# Patient Record
Sex: MALE | Race: WHITE | NOT HISPANIC OR LATINO | Employment: FULL TIME | URBAN - METROPOLITAN AREA
[De-identification: names, ages, dates, MRNs, and addresses within clinical notes are randomized per-mention and may not be internally consistent; named-entity substitution may affect disease eponyms.]

---

## 2018-10-23 ENCOUNTER — OFFICE VISIT (OUTPATIENT)
Dept: OBGYN CLINIC | Facility: MEDICAL CENTER | Age: 53
End: 2018-10-23
Payer: COMMERCIAL

## 2018-10-23 ENCOUNTER — APPOINTMENT (OUTPATIENT)
Dept: RADIOLOGY | Facility: CLINIC | Age: 53
End: 2018-10-23
Payer: COMMERCIAL

## 2018-10-23 VITALS
BODY MASS INDEX: 31.15 KG/M2 | WEIGHT: 230 LBS | SYSTOLIC BLOOD PRESSURE: 121 MMHG | HEART RATE: 44 BPM | DIASTOLIC BLOOD PRESSURE: 74 MMHG | HEIGHT: 72 IN

## 2018-10-23 DIAGNOSIS — R20.2 NUMBNESS AND TINGLING: ICD-10-CM

## 2018-10-23 DIAGNOSIS — R20.0 NUMBNESS AND TINGLING: ICD-10-CM

## 2018-10-23 DIAGNOSIS — M25.512 ACUTE PAIN OF LEFT SHOULDER: Primary | ICD-10-CM

## 2018-10-23 DIAGNOSIS — M25.512 ACUTE PAIN OF LEFT SHOULDER: ICD-10-CM

## 2018-10-23 PROCEDURE — 73030 X-RAY EXAM OF SHOULDER: CPT

## 2018-10-23 PROCEDURE — 72040 X-RAY EXAM NECK SPINE 2-3 VW: CPT

## 2018-10-23 PROCEDURE — 99203 OFFICE O/P NEW LOW 30 MIN: CPT | Performed by: ORTHOPAEDIC SURGERY

## 2018-10-23 RX ORDER — METOPROLOL TARTRATE 100 MG/1
TABLET ORAL
COMMUNITY
Start: 2018-10-02

## 2018-10-23 RX ORDER — ATORVASTATIN CALCIUM 20 MG/1
TABLET, FILM COATED ORAL
COMMUNITY
Start: 2018-10-02

## 2018-10-23 NOTE — PROGRESS NOTES
Orthopaedic Surgery - Office Note  Maribel Villeda (83 y o  male)   : 1965   MRN: [id number]  Encounter Date: 10/23/2018    Chief Complaint   Patient presents with    Left Shoulder - Pain, Numbness       Assessment / Plan  cervical left C6 radiculopathy    · Activity as tolerated  · Begin outpatient PT for left C6 radiculopathy  Return in about 6 weeks (around 2018) for Recheck  History of Present Illness  Maribel Villeda is a 47 y o  male who presents to the office with left shoulder pain  He describes a possible PHIL as an incident about  3 weeks ago where he was walking his dog when another dog approached causing his to pull on the leash, hyperextending his arm  He describes numbness into his forearm thumb and index finger  Review of Systems  Pertinent items are noted in HPI  All other systems were reviewed and are negative  A comprehensive review of systems was negative  Physical Exam  /74   Pulse (!) 44   Ht 6' (1 829 m)   Wt 104 kg (230 lb)   BMI 31 19 kg/m²   Cons: Appears well  No apparent distress  Psych: Alert  Oriented x3  Mood and affect normal   Eyes: PERRLA, EOMI  Resp: Normal effort  No audible wheezing or stridor  CV: Palpable pulse  No discernable arrhythmia  No LE edema  Lymph:  No palpable cervical, axillary, or inguinal lymphadenopathy  Skin: Warm  No palpable masses  No visible lesions  Neuro: Normal muscle tone  Normal and symmetric DTR's  Left Shoulder Exam  Alignment / Posture:  Normal shoulder posture  Inspection:  No swelling  No deformity  Palpation:  mild tenderness at trapezius  ROM:  Shoulder   Shoulder ER 70  Shoulder IR T6  Shoulder AER 80  Shoulder AIR 80  Strength:  5/5 supraspinatus, infraspinatus, and subscapularis  Stability:  No objective shoulder instability  Tests: (+) Spurling  Neurovascular:  dimished sensation to Ax/R/M/U nerve distributions 2+ radial pulse      Studies Reviewed  XR of left shoulder and cervical spine - 10/23/18 shows no fractures, degenerative changes to C5/6    Procedures  No procedures today  Medical, Surgical, Family, and Social History  The patient's medical history, family history, and social history, were reviewed and updated as appropriate  Past Medical History:   Diagnosis Date    Diabetes insipidus ([address] )     Hyperlipemia     Hypertension        Past Surgical History:   Procedure Laterality Date    ANKLE SURGERY Left     bone spur    NEPHRECTOMY Right 2005       Family History   Problem Relation Age of Onset    Heart disease Father        Social History     Occupational History    Not on file       Social History Main Topics    Smoking status: Never Smoker    Smokeless tobacco: Never Used    Alcohol use Yes    Drug use: Unknown    Sexual activity: Not on file       No Known Allergies      Current Outpatient Prescriptions:     atorvastatin (LIPITOR) 20 mg tablet, TAKE 1 TABLET BY MOUTH  DAILY, Disp: , Rfl:     metFORMIN (GLUCOPHAGE) 500 mg tablet, TAKE 1 TABLET BY MOUTH TWO  TIMES DAILY WITH MEALS, Disp: , Rfl:     metoprolol tartrate (LOPRESSOR) 100 mg tablet, TAKE 1 TABLET BY MOUTH TWO  TIMES DAILY, Disp: , Rfl:       Antonietta Chavira MA    Scribe Attestation    I,:   Antonietta Chavira MA am acting as a scribe while in the presence of the attending physician :        I,:   Lisset Todd MD personally performed the services described in this documentation    as scribed in my presence :

## 2018-10-30 ENCOUNTER — EVALUATION (OUTPATIENT)
Dept: PHYSICAL THERAPY | Facility: REHABILITATION | Age: 53
End: 2018-10-30
Payer: COMMERCIAL

## 2018-10-30 DIAGNOSIS — M54.12 CERVICAL RADICULOPATHY: Primary | ICD-10-CM

## 2018-10-30 PROCEDURE — G8984 CARRY CURRENT STATUS: HCPCS | Performed by: PHYSICAL THERAPIST

## 2018-10-30 PROCEDURE — 97140 MANUAL THERAPY 1/> REGIONS: CPT | Performed by: PHYSICAL THERAPIST

## 2018-10-30 PROCEDURE — 97162 PT EVAL MOD COMPLEX 30 MIN: CPT | Performed by: PHYSICAL THERAPIST

## 2018-10-30 PROCEDURE — G8985 CARRY GOAL STATUS: HCPCS | Performed by: PHYSICAL THERAPIST

## 2018-10-30 NOTE — PROGRESS NOTES
PT Evaluation     Today's date: 10/30/2018  Patient name: Bill Scott  : 1965  MRN: [id number]  Referring provider: Austin Wooten MD  Dx:   Encounter Diagnosis     ICD-10-CM    1  Cervical radiculopathy M54 12                   Assessment  Impairments: abnormal or restricted ROM, impaired physical strength, lacks appropriate home exercise program and pain with function    Symptom irritability: high  Assessment details: Pt is a 46 yo male with left cervical radiculopathy whose symptoms consist of left periscapular pain and paresthesias and decreased sensation into the thumb and index finger  Pain has been present for one month and is quite severe at time limiting his ability to drive and sleep  He is currently in forward head posture  Correction of posture results in peripheralization of symptoms  We were able to reduce symptoms with both left side bending and rotation but we were unable to regain cervical retraction or extension or abolish peripheral symptoms  Pt would benefit from manual therapy and repeated motion exercises to reduce derangement and restore functional mobility  He will also benefit from [address] and ES to decrease pain and spasm  Understanding of Dx/Px/POC: excellent   Prognosis: good  Prognosis details: Pt has both weakness and sensation changes and I was unable to centralize symptoms today  Plan  Patient would benefit from: skilled physical therapy  Referral necessary: No  Planned modality interventions: TENS  Planned therapy interventions: manual therapy, joint mobilization, patient education, therapeutic exercise and home exercise program  Frequency: 2x week  Duration in weeks: 12        Subjective Evaluation    History of Present Illness  Mechanism of injury: One month ago pain began in the scapular region  Developed tingling and numbness in the thumb and fore finger  Numbness increases when upright  Drives up to 6 hours per day for work     Pain  Current pain rating: 7  At best pain rating: 3  At worst pain ratin  Location: right scapular region  Quality: dull ache  Aggravating factors: sitting      Diagnostic Tests  Abnormal x-ray: C6 issue  Patient Goals  Patient goals for therapy: decreased pain  Patient goal: resolve numbness, drive, use computer        Objective     Special Questions  Positive for disturbed sleep  Negative for night pain, dizziness, faints, headaches, nausea/motion sickness, tinnitus, trouble swallowing, difficulty breathing, shortness of breath, respiratory pain and visual change    Postural Observations  Seated posture: poor  Correction of posture: makes symptoms worse        Palpation   Left   Hypertonic in the cervical paraspinals, levator scapulae, suboccipitals and upper trapezius  Tenderness of the levator scapulae and upper trapezius  Right   Hypertonic in the suboccipitals and upper trapezius  Tenderness     Additional Tenderness Details  C5, C6 tender   Hypomobility throughout the cervical and upper thoracic spine  Neurological Testing     Sensation   Cervical/Thoracic   Left   Intact: light touch    Right   Intact: light touch    Reflexes   Left   Biceps (C5/C6): normal (2+)    Right   Biceps (C5/C6): normal (2+)    Active Range of Motion     Additional Active Range of Motion Details  ROM restriction:  Flexion: WNL  Extension: mod limit  Side bending:  WNL  Rotation: R WNL  L mod limit    Passive Range of Motion     Additional Passive Range of Motion Details  PROM WNL except retraction and extension, mod limit by pain    Joint Play Joints within functional limits: C1, C2, C3 and C4 Hypomobile: C5, C6, C7, T1 and T2 Pain: C5, C6 and C7     Strength/Myotome Testing   Cervical Spine     Left   Interossei strength (t1): 5  Neck lateral flexion (C3): 5    Right   Interossei strength (t1): 5etr  Neck lateral flexion (C3): 5    Left Shoulder     Planes of Motion   Flexion: 4+   Abduction: 5     Right Shoulder     Planes of Motion Flexion: 5   Abduction: 5     Left Elbow   Flexion: 4  Extension: 5    Right Elbow   Flexion: 5  Extension: 5    Left Wrist/Hand   Wrist flexion: 5    Right Wrist/Hand   Wrist flexion: 5    Tests   Cervical     Left   Negative cervical distraction, Spurling's sign and cervical compression test       Right   Positive Spurling's sign  Negative cervical distraction and cervical compression test       Left Shoulder   Positive ULTT1 and ULTT3  Negative ULTT4       Additional Tests Details  Cervical Mechanical Assessment:   Repeated Cervical Flexion in Sitting: no effect  Repeated Cervical Retraction in Sitting: increased peripheralized  Repeated Cervical Retraction/Extension in Sitting: not tested  Repeated Cervical Retraction Supine: increased peripheralized  Repeated Cervical Sidebending: Decreased, no better  Repeated Cervical Rotation: Decreased, better, centralized pain but did not resolve parasthesias  Repeated Cervical Protrusion: Not tested          Precautions: HTN, Hx cancer    Daily Treatment Diary     Manual  10/30            SB mob sitting and supine 8x10            Manual tx 5 min                                                       Exercise Diary              Left SB 5x10            Left rotation 5x10                                                                                                                                                                                                                                                          Modalities              MH with IF 20 min                                      X=same as last visit

## 2018-11-01 ENCOUNTER — OFFICE VISIT (OUTPATIENT)
Dept: PHYSICAL THERAPY | Facility: REHABILITATION | Age: 53
End: 2018-11-01
Payer: COMMERCIAL

## 2018-11-01 DIAGNOSIS — M54.12 CERVICAL RADICULOPATHY: Primary | ICD-10-CM

## 2018-11-01 PROCEDURE — 97110 THERAPEUTIC EXERCISES: CPT | Performed by: PHYSICAL THERAPIST

## 2018-11-01 PROCEDURE — 97014 ELECTRIC STIMULATION THERAPY: CPT | Performed by: PHYSICAL THERAPIST

## 2018-11-01 PROCEDURE — 97140 MANUAL THERAPY 1/> REGIONS: CPT | Performed by: PHYSICAL THERAPIST

## 2018-11-06 ENCOUNTER — OFFICE VISIT (OUTPATIENT)
Dept: PHYSICAL THERAPY | Facility: REHABILITATION | Age: 53
End: 2018-11-06
Payer: COMMERCIAL

## 2018-11-06 DIAGNOSIS — M54.12 CERVICAL RADICULOPATHY: Primary | ICD-10-CM

## 2018-11-06 PROCEDURE — 97140 MANUAL THERAPY 1/> REGIONS: CPT | Performed by: PHYSICAL THERAPIST

## 2018-11-06 NOTE — PROGRESS NOTES
Daily Note     Today's date: 2018  Patient name: Mini Wells  : 1965  MRN: [id number]  Referring provider: Lorena Haider MD  Dx:   Encounter Diagnosis     ICD-10-CM    1  Cervical radiculopathy M54 12                   Subjective: Feels that his numbness is increasing  Performed the cervical rotation SNAGS and mobility increased  Objective: See treatment diary below    Daily Treatment Diary     Manual  10/30 11/1 11/6          SB mob sitting and supine 8x10  20 min          Manual tx 5 min x           Manual tx, retr  10 min           STM   10 min                           Exercise Diary              Left SB 5x10            Left rotation 5x10 SNAG 2x5 x          Cervical retraction after sets of SB mobs 1-2 each set   10 total                                                                                                                                                                                                                                           Modalities              MH with IF 20 min x                                     X=same as last visit    Assessment: Paraesthesias have worsened  SB mobs did reduce pain slightly but did not change paraesthesias  Tested right SB as well but this offered no change  Felt slightly better after manual therapy  Advised him to try SBx10 f/b one retraction 5-6x a day  He will come back in if this seems to be helping otherwise he will wait for an appointment with pain management as manual therapy and exercises have not yet been effective  Plan: Continue per plan of care

## 2018-11-08 ENCOUNTER — TELEPHONE (OUTPATIENT)
Dept: OBGYN CLINIC | Facility: HOSPITAL | Age: 53
End: 2018-11-08

## 2018-11-08 ENCOUNTER — APPOINTMENT (OUTPATIENT)
Dept: PHYSICAL THERAPY | Facility: REHABILITATION | Age: 53
End: 2018-11-08
Payer: COMMERCIAL

## 2018-11-08 NOTE — TELEPHONE ENCOUNTER
Caller: patient  Call back number: [phone]  Patient's doctor: Dr Quezada Goes    Patient called stating he was seen by PT but told him that they can not help him  They referred him to spine and pain  Patient is upset that he needs to wait until 12/11 to be seen by Dr Lucía Crook  He is asking what he is supposed to do until then  He also states he can not work until he sees Lucía Crook  He is asking for a note to be off work until then   Please advise

## 2018-11-09 ENCOUNTER — CLINICAL SUPPORT (OUTPATIENT)
Dept: PAIN MEDICINE | Facility: CLINIC | Age: 53
End: 2018-11-09
Payer: COMMERCIAL

## 2018-11-09 VITALS
TEMPERATURE: 98.6 F | BODY MASS INDEX: 31.69 KG/M2 | WEIGHT: 234 LBS | SYSTOLIC BLOOD PRESSURE: 124 MMHG | HEART RATE: 47 BPM | HEIGHT: 72 IN | DIASTOLIC BLOOD PRESSURE: 74 MMHG

## 2018-11-09 DIAGNOSIS — M50.30 DDD (DEGENERATIVE DISC DISEASE), CERVICAL: ICD-10-CM

## 2018-11-09 DIAGNOSIS — M54.12 CERVICAL RADICULOPATHY: Primary | ICD-10-CM

## 2018-11-09 DIAGNOSIS — M79.18 MYOFASCIAL PAIN: ICD-10-CM

## 2018-11-09 PROCEDURE — 99204 OFFICE O/P NEW MOD 45 MIN: CPT | Performed by: ANESTHESIOLOGY

## 2018-11-09 RX ORDER — INFLUENZA A VIRUS A/SINGAPORE/GP1908/2015 IVR-180A (H1N1) ANTIGEN (PROPIOLACTONE INACTIVATED), INFLUENZA A VIRUS A/HONG KONG/4801/2014 X-263B (H3N2) ANTIGEN (PROPIOLACTONE INACTIVATED), INFLUENZA B VIRUS B/BRISBANE/46/2015 ANTIGEN (PROPIOLACTONE INACTIVATED), AND INFLUENZA B VIRUS B/PHUKET/3073/2013 BVR-1B ANTIGEN (PROPIOLACTONE INACTIVATED) 15; 15; 15; 15 UG/.5ML; UG/.5ML; UG/.5ML; UG/.5ML
INJECTION, SUSPENSION INTRAMUSCULAR
COMMUNITY
Start: 2018-10-17

## 2018-11-09 RX ORDER — SILDENAFIL 25 MG/1
25 TABLET, FILM COATED ORAL
COMMUNITY
Start: 2016-06-02

## 2018-11-09 RX ORDER — METHYLPREDNISOLONE 4 MG/1
TABLET ORAL
Qty: 21 TABLET | Refills: 0 | Status: SHIPPED | OUTPATIENT
Start: 2018-11-09

## 2018-11-09 NOTE — PROGRESS NOTES
Assessment:  1  Cervical radiculopathy    2  DDD (degenerative disc disease), cervical    3  Myofascial pain        Plan:  54-year-old male presenting for initial consultation regarding neck pain with cervical radiculopathy into the C6 distribution of the left upper extremity for the past month or so  The patient states that he thinks this may have occurred when he was restraining his dog from getting into an altercation with another dog  The patient has done physical therapy which only exacerbated his symptoms  He had gone to approximately 3 sessions  Although he did regain some range of motion of his neck the radicular symptoms in his left upper extremity worsened with the PT sessions  It was recommended by the physical therapist to discontinue secondary to worsening of symptoms  He is unable to take NSAIDs secondary to history of kidney cancer status post nephrectomy  He has been taking Tylenol p r n  With minimal relief  The patient was evaluated by orthopedics who did not feel that this was related to his left shoulder and has kindly refer the patient for further evaluation and treatment  The patient does have weakness of pincer grasp of the left hand  He does have a positive Spurling's to the left indicating likely radiculopathy  1  I will order an MRI of the patient's cervical spine  2  I will prescribe a Medrol Dosepak to reduce the inflammatory component of his pain  3  I did offer the patient a trial of gabapentin, however he would like to hold off on sedating medications at this time  4  Will avoid NSAIDs secondary to renal function  5  I will follow up the patient in 2 months and will call him with the results of MRI once received and our recommendations based upon those results     My impressions and treatment recommendations were discussed in detail with the patient who verbalized understanding and had no further questions  Discharge instructions were provided   I personally saw and examined the patient and I agree with the above discussed plan of care  No orders of the defined types were placed in this encounter  New Medications Ordered This Visit   Medications    sildenafil (VIAGRA) 25 MG tablet     Sig: Take 25 mg by mouth    AFLURIA QUADRIVALENT SUSP       History of Present Illness:    Armando Swain is a 47 y o  male presenting for initial consultation regarding neck pain that radiates into the scapular region on the left and into the posterolateral aspect of the left upper extremity to the thumb with associated numbness, paresthesias, and subjective weakness  He denies any right upper extremity symptoms, bladder bowel incontinence, or balance issues  The patient has been dealing with the symptoms for the past month and feels that is related to an incident where he was trying to restrain his dog from fighting with another dog getting countered while on a walk  The patient has done physical therapy which only exacerbated his symptoms  He had gone to approximately 3 sessions  Although he did regain some range of motion of his neck the radicular symptoms in his left upper extremity worsened with the PT sessions  It was recommended by the physical therapist to discontinue secondary to worsening of symptoms  He is unable to take NSAIDs secondary to history of kidney cancer status post nephrectomy  He has been taking Tylenol p r n  With minimal relief  The patient was evaluated by orthopedics who did not feel that this was related to his left shoulder and has kindly refer the patient for further evaluation and treatment  The patient rates his pain a 10/10 on the pain is nearly constant  The pain does not follow any particular pattern throughout the day  The pain is described as shooting, numbness, sharp, dull, aching, and pressure like  The pain is decreased with lying down  The pain is increased with standing, sitting, walking, and exercise    He has not gotten any relief with a TENS unit or heat or ice application  I have personally reviewed and/or updated the patient's past medical history, past surgical history, family history, social history, current medications, allergies, and vital signs today  Other than as stated above, the patient denies any interval changes in medications, medical condition, mental condition, symptoms, or allergies since the last office visit  Review of Systems:    Review of Systems   Constitutional: Negative for fever and unexpected weight change  HENT: Negative for trouble swallowing  Eyes: Negative for visual disturbance  Respiratory: Negative for shortness of breath and wheezing  Cardiovascular: Negative for chest pain and palpitations  Gastrointestinal: Negative for constipation, diarrhea, nausea and vomiting  Endocrine: Negative for cold intolerance, heat intolerance and polydipsia  Genitourinary: Negative for difficulty urinating and frequency  Musculoskeletal: Negative for arthralgias, gait problem, joint swelling and myalgias  Skin: Negative for rash  Neurological: Negative for dizziness, seizures, syncope, weakness and headaches  Hematological: Does not bruise/bleed easily  Psychiatric/Behavioral: Negative for dysphoric mood  All other systems reviewed and are negative  There is no problem list on file for this patient        Past Medical History:   Diagnosis Date    [facility]     [facility]     Hyperlipemia     Hypertension     Hypertension     Kidney disease     Neuropathy in diabetes ([facility] )        Past Surgical History:   Procedure Laterality Date    ANKLE ARTHODESIS W/ ARTHROSCOPY      ANKLE SURGERY Left     bone spur    NEPHRECTOMY Right 2005    NEPHRECTOMY Right        Family History   Problem Relation Age of Onset    Heart disease Father        Social History     Occupational History    field access reinbusment      Social History Main Topics    Smoking status: Never Smoker    Smokeless tobacco: Never Used    Alcohol use Yes    Drug use: Unknown    Sexual activity: Not on file       Current Outpatient Prescriptions on File Prior to Visit   Medication Sig    atorvastatin (LIPITOR) 20 mg tablet TAKE 1 TABLET BY MOUTH  DAILY    metFORMIN (GLUCOPHAGE) 500 mg tablet TAKE 1 TABLET BY MOUTH TWO  TIMES DAILY WITH MEALS    metoprolol tartrate (LOPRESSOR) 100 mg tablet TAKE 1 TABLET BY MOUTH TWO  TIMES DAILY     No current facility-administered medications on file prior to visit  No Known Allergies    Physical Exam:    /74   Pulse (!) 47   Temp 98 6 °F (37 °C) (Oral)   Ht 6' (1 829 m)   Wt 106 kg (234 lb)   BMI 31 74 kg/m²     Constitutional: normal, well developed, well nourished, alert, in no distress and non-toxic and no overt pain behavior  Eyes: anicteric  HEENT: grossly intact  Neck: supple, symmetric, trachea midline and no masses   Pulmonary:even and unlabored  Cardiovascular:No edema or pitting edema present  Skin:Normal without rashes or lesions and well hydrated  Psychiatric:Mood and affect appropriate  Neurologic:Cranial Nerves II-XII grossly intact  Musculoskeletal:normal gait  Left cervical paraspinals and trapezius tender to palpation ropy in texture  Full range of motion of cervical spine in all planes  Bilateral biceps, triceps, brachioradialis, patellar, and Achilles reflexes were 2/4 and symmetrical   No clonus was noted bilaterally  Negative Warner's reflex bilaterally  Bilateral upper extremity strength 5/5 in all muscle groups with the exception of left pincer grasp which was 4/5  Sensation decreased to light touch in the left thumb, otherwise sensation intact to light touch in the C5 through T1 dermatomes bilaterally  Positive Spurling's to the left and negative to the right      Imaging    PACS Images     Show images for XR spine cervical 2 or 3 vw injury   Order Report      Order Details   Order Questions     Question Answer Comment   Exam reason numbness    Note:  Enter reason for exam          Reason For Exam     numbness   Dx: Numbness and tingling [R20 0, R20 2 (ICD-10-CM)]   Study Result     CERVICAL SPINE     INDICATION:   R20 0: Anesthesia of skin  R20 2: Paresthesia of skin  "History/Symptoms - left shoulder pain x 3 weeks with numbness and tingling in the left arm x 2 weeks  states symptoms increase when arm is hanging at his side and symptoms are relieved when arm is above head "     COMPARISON:  None     VIEWS:  XR SPINE CERVICAL 2 OR 3 VW INJURY         FINDINGS:     No evidence of fracture       Normal alignment without subluxation      Mild disc space height loss at C5-C6 with mild associated endplate osteophyte formation      The prevertebral soft tissues are within normal limits        The lung apices are clear      IMPRESSION:     Mild degenerative changes at C5-C6         Workstation performed: [id number]      Imaging     XR spine cervical 2 or 3 vw injury (Order #[id number]) on 10/23/2018 - Imaging Information   Result History     XR spine cervical 2 or 3 vw injury (Order #[id number]) on 10/26/2018 - Order Result History Report    Show result history   Result Comparison   XR spine cervical 2 or 3 vw injury (Order [id number])   Newer Version Older Version   Final result    10/26/2018 12:46 PM    Interface, Radiology Results In         This is the newest version No older versions exist   Narrative     CERVICAL SPINE     INDICATION:   R20 0: Anesthesia of skin   R20 2: Paresthesia of skin   "History/Symptoms - left shoulder pain x 3 weeks with numbness and tingling in the left arm x 2 weeks  states symptoms increase when arm is hanging at his side and symptoms are relieved when arm is above head "     COMPARISON:  None     VIEWS:  XR SPINE CERVICAL 2 OR 3 VW INJURY        FINDINGS:     No evidence of fracture  Normal alignment without subluxation       Mild disc space height loss at C5-C6 with mild associated endplate osteophyte formation  The prevertebral soft tissues are within normal limits        The lung apices are clear  Impression       Mild degenerative changes at C5-C6  Workstation performed: [id number]        PACS Images     Show images for XR shoulder 2+ vw left   Order Report      Order Details   Order Questions     Question Answer Comment   Exam reason pain    Note:  Enter reason for exam          Reason For Exam     pain   Dx: Acute pain of left shoulder [M25 512 (ICD-10-CM)]   Study Result     LEFT SHOULDER     INDICATION:   M25 512: Pain in left shoulder  "left shoulder pain x 3 weeks with numbness and tingling in the left arm x 2 weeks  states symptoms increase when arm is hanging at his side and symptoms are relieved when arm is above head "     COMPARISON:  None     VIEWS:  XR SHOULDER 2+ VW LEFT         FINDINGS:     There is no acute fracture or dislocation      Mild left acromioclavicular degenerative changes      No lytic or blastic lesions are seen      Soft tissues are unremarkable      IMPRESSION:     Mild left acromioclavicular degenerative changes            Workstation performed: [id number]      Imaging     XR shoulder 2+ vw left (Order #[id number]) on 10/23/2018 - Imaging Information   Result History     XR shoulder 2+ vw left (Order #[id number]) on 10/26/2018 - Order Result History Report    Show result history   Result Comparison   XR shoulder 2+ vw left (Order [id number])   Newer Version Older Version   Final result    10/26/2018 12:51 PM    Interface, Radiology Results In         This is the newest version No older versions exist   Narrative     LEFT SHOULDER     INDICATION:   M25 512: Pain in left shoulder   "left shoulder pain x 3 weeks with numbness and tingling in the left arm x 2 weeks   states symptoms increase when arm is hanging at his side and symptoms are relieved when arm is above head "     COMPARISON:  None     VIEWS:  XR SHOULDER 2+ VW LEFT        FINDINGS: There is no acute fracture or dislocation  Mild left acromioclavicular degenerative changes  No lytic or blastic lesions are seen  Soft tissues are unremarkable  Impression       Mild left acromioclavicular degenerative changes           Workstation performed: [id number]

## 2021-03-25 DIAGNOSIS — Z23 ENCOUNTER FOR IMMUNIZATION: ICD-10-CM
